# Patient Record
Sex: MALE | Race: BLACK OR AFRICAN AMERICAN | NOT HISPANIC OR LATINO | URBAN - METROPOLITAN AREA
[De-identification: names, ages, dates, MRNs, and addresses within clinical notes are randomized per-mention and may not be internally consistent; named-entity substitution may affect disease eponyms.]

---

## 2022-06-02 ENCOUNTER — EMERGENCY (EMERGENCY)
Facility: HOSPITAL | Age: 57
LOS: 1 days | Discharge: HOME | End: 2022-06-02
Admitting: EMERGENCY MEDICINE
Payer: COMMERCIAL

## 2022-06-02 VITALS
OXYGEN SATURATION: 98 % | SYSTOLIC BLOOD PRESSURE: 155 MMHG | WEIGHT: 279.99 LBS | DIASTOLIC BLOOD PRESSURE: 88 MMHG | RESPIRATION RATE: 18 BRPM | HEART RATE: 84 BPM | TEMPERATURE: 99 F

## 2022-06-02 DIAGNOSIS — M25.461 EFFUSION, RIGHT KNEE: ICD-10-CM

## 2022-06-02 DIAGNOSIS — M25.561 PAIN IN RIGHT KNEE: ICD-10-CM

## 2022-06-02 DIAGNOSIS — M19.90 UNSPECIFIED OSTEOARTHRITIS, UNSPECIFIED SITE: ICD-10-CM

## 2022-06-02 DIAGNOSIS — E11.9 TYPE 2 DIABETES MELLITUS WITHOUT COMPLICATIONS: ICD-10-CM

## 2022-06-02 DIAGNOSIS — I10 ESSENTIAL (PRIMARY) HYPERTENSION: ICD-10-CM

## 2022-06-02 DIAGNOSIS — Z20.822 CONTACT WITH AND (SUSPECTED) EXPOSURE TO COVID-19: ICD-10-CM

## 2022-06-02 DIAGNOSIS — Z96.641 PRESENCE OF RIGHT ARTIFICIAL HIP JOINT: ICD-10-CM

## 2022-06-02 LAB
ANION GAP SERPL CALC-SCNC: 11 MMOL/L — SIGNIFICANT CHANGE UP (ref 5–17)
BASOPHILS # BLD AUTO: 0.04 K/UL — SIGNIFICANT CHANGE UP (ref 0–0.2)
BASOPHILS NFR BLD AUTO: 0.4 % — SIGNIFICANT CHANGE UP (ref 0–2)
BUN SERPL-MCNC: 15 MG/DL — SIGNIFICANT CHANGE UP (ref 7–23)
CALCIUM SERPL-MCNC: 9.6 MG/DL — SIGNIFICANT CHANGE UP (ref 8.4–10.5)
CHLORIDE SERPL-SCNC: 94 MMOL/L — LOW (ref 96–108)
CO2 SERPL-SCNC: 29 MMOL/L — SIGNIFICANT CHANGE UP (ref 22–31)
CREAT SERPL-MCNC: 0.92 MG/DL — SIGNIFICANT CHANGE UP (ref 0.5–1.3)
CRP SERPL-MCNC: 91.3 MG/L — HIGH (ref 0–4)
EGFR: 96 ML/MIN/1.73M2 — SIGNIFICANT CHANGE UP
EOSINOPHIL # BLD AUTO: 0.09 K/UL — SIGNIFICANT CHANGE UP (ref 0–0.5)
EOSINOPHIL NFR BLD AUTO: 1 % — SIGNIFICANT CHANGE UP (ref 0–6)
ERYTHROCYTE [SEDIMENTATION RATE] IN BLOOD: 64 MM/HR — HIGH
GLUCOSE SERPL-MCNC: 123 MG/DL — HIGH (ref 70–99)
HCT VFR BLD CALC: 36.4 % — LOW (ref 39–50)
HGB BLD-MCNC: 12.1 G/DL — LOW (ref 13–17)
IMM GRANULOCYTES NFR BLD AUTO: 1 % — SIGNIFICANT CHANGE UP (ref 0–1.5)
LYMPHOCYTES # BLD AUTO: 1.53 K/UL — SIGNIFICANT CHANGE UP (ref 1–3.3)
LYMPHOCYTES # BLD AUTO: 16.6 % — SIGNIFICANT CHANGE UP (ref 13–44)
MCHC RBC-ENTMCNC: 29.7 PG — SIGNIFICANT CHANGE UP (ref 27–34)
MCHC RBC-ENTMCNC: 33.2 GM/DL — SIGNIFICANT CHANGE UP (ref 32–36)
MCV RBC AUTO: 89.2 FL — SIGNIFICANT CHANGE UP (ref 80–100)
MONOCYTES # BLD AUTO: 1.05 K/UL — HIGH (ref 0–0.9)
MONOCYTES NFR BLD AUTO: 11.4 % — SIGNIFICANT CHANGE UP (ref 2–14)
NEUTROPHILS # BLD AUTO: 6.39 K/UL — SIGNIFICANT CHANGE UP (ref 1.8–7.4)
NEUTROPHILS NFR BLD AUTO: 69.6 % — SIGNIFICANT CHANGE UP (ref 43–77)
NRBC # BLD: 0 /100 WBCS — SIGNIFICANT CHANGE UP (ref 0–0)
PLATELET # BLD AUTO: 257 K/UL — SIGNIFICANT CHANGE UP (ref 150–400)
POTASSIUM SERPL-MCNC: 4.5 MMOL/L — SIGNIFICANT CHANGE UP (ref 3.5–5.3)
POTASSIUM SERPL-SCNC: 4.5 MMOL/L — SIGNIFICANT CHANGE UP (ref 3.5–5.3)
RBC # BLD: 4.08 M/UL — LOW (ref 4.2–5.8)
RBC # FLD: 12.8 % — SIGNIFICANT CHANGE UP (ref 10.3–14.5)
SARS-COV-2 RNA SPEC QL NAA+PROBE: NEGATIVE — SIGNIFICANT CHANGE UP
SODIUM SERPL-SCNC: 134 MMOL/L — LOW (ref 135–145)
URATE SERPL-MCNC: 5.3 MG/DL — SIGNIFICANT CHANGE UP (ref 3.4–8.8)
WBC # BLD: 9.19 K/UL — SIGNIFICANT CHANGE UP (ref 3.8–10.5)
WBC # FLD AUTO: 9.19 K/UL — SIGNIFICANT CHANGE UP (ref 3.8–10.5)

## 2022-06-02 PROCEDURE — 99284 EMERGENCY DEPT VISIT MOD MDM: CPT | Mod: 25

## 2022-06-02 PROCEDURE — 80048 BASIC METABOLIC PNL TOTAL CA: CPT

## 2022-06-02 PROCEDURE — 84550 ASSAY OF BLOOD/URIC ACID: CPT

## 2022-06-02 PROCEDURE — 86140 C-REACTIVE PROTEIN: CPT

## 2022-06-02 PROCEDURE — 73502 X-RAY EXAM HIP UNI 2-3 VIEWS: CPT

## 2022-06-02 PROCEDURE — 73564 X-RAY EXAM KNEE 4 OR MORE: CPT

## 2022-06-02 PROCEDURE — 85652 RBC SED RATE AUTOMATED: CPT

## 2022-06-02 PROCEDURE — 73564 X-RAY EXAM KNEE 4 OR MORE: CPT | Mod: 26

## 2022-06-02 PROCEDURE — 96374 THER/PROPH/DIAG INJ IV PUSH: CPT

## 2022-06-02 PROCEDURE — 85025 COMPLETE CBC W/AUTO DIFF WBC: CPT

## 2022-06-02 PROCEDURE — 73502 X-RAY EXAM HIP UNI 2-3 VIEWS: CPT | Mod: 26,RT

## 2022-06-02 PROCEDURE — 73564 X-RAY EXAM KNEE 4 OR MORE: CPT | Mod: 26,RT

## 2022-06-02 PROCEDURE — 99281 EMR DPT VST MAYX REQ PHY/QHP: CPT

## 2022-06-02 PROCEDURE — 73502 X-RAY EXAM HIP UNI 2-3 VIEWS: CPT | Mod: 26

## 2022-06-02 PROCEDURE — 87635 SARS-COV-2 COVID-19 AMP PRB: CPT

## 2022-06-02 PROCEDURE — 36415 COLL VENOUS BLD VENIPUNCTURE: CPT

## 2022-06-02 RX ORDER — KETOROLAC TROMETHAMINE 30 MG/ML
15 SYRINGE (ML) INJECTION ONCE
Refills: 0 | Status: DISCONTINUED | OUTPATIENT
Start: 2022-06-02 | End: 2022-06-02

## 2022-06-02 RX ADMIN — Medication 15 MILLIGRAM(S): at 14:22

## 2022-06-02 NOTE — ED PROVIDER NOTE - NSFOLLOWUPINSTRUCTIONS_ED_ALL_ED_FT
Take tylenol 650mg or motrin 400-800mg as needed every 4-6 hours for pain.   Continue percocet as prescribed by your surgeon     REST- Rest your hurting/injured joint or extremity to decrease pain and swelling for 24-48 hours    ICE- Apply ice to area of pain to decreased inflammation and pain, put towel/barrier between ice and skin. You can keep ice on for 20 minutes at a time 4-8 times daily   COMPRESSION- Wear ace wrap or brace for support to reduce swelling.  Make sure not to wrap too tight, loosen if skin feeling numb/tingling or skin turns blue   ELEVATION- Elevate hurting/injured area 6 or more inches about level of heart to decrease swelling/inflammation.  Use pillow under joint to elevate area Take tylenol 650mg or motrin 400-800mg as needed every 4-6 hours for pain.   Continue percocet as prescribed by your surgeon     REST- Rest your hurting/injured joint or extremity to decrease pain and swelling for 24-48 hours    ICE- Apply ice to area of pain to decreased inflammation and pain, put towel/barrier between ice and skin. You can keep ice on for 20 minutes at a time 4-8 times daily   COMPRESSION- Wear ace wrap or brace for support to reduce swelling.  Make sure not to wrap too tight, loosen if skin feeling numb/tingling or skin turns blue   ELEVATION- Elevate hurting/injured area 6 or more inches about level of heart to decrease swelling/inflammation.  Use pillow under joint to elevate area    Call to arrange follow up with your surgeon at [place] within one week

## 2022-06-02 NOTE — ED PROVIDER NOTE - PROGRESS NOTE DETAILS
d/w ortho, suspect post surgical changes.  recommend adding NSAIDs, continue ambulation w/ cane and f/u with primary surgeon at [facility].  educated on RICE and discussed strict return parameters

## 2022-06-02 NOTE — CONSULT NOTE ADULT - SUBJECTIVE AND OBJECTIVE BOX
Orthopaedic Surgery Consult Note    For Surgeon:    HPI:  Patient is a 57-year-old Male who presents with a chief complaint of right knee pain. Patient underwent right hip replacement surgery on 5/27/22 at [facility]. On physical exam right knee appears swollen and tender to palpation in the suprapatellar area. Right knee is edematous in the suprapatellar region. No erythema, warmth, or lacerations are present on the knees bilaterally. No calf pain on examination of the right knee. Patient was sitting in an upright position with the legs in extension bilaterally. Patient unable to flex the right knee past 60 degrees secondary to pain.     Allergies    No Known Allergies    Intolerances      PAST MEDICAL & SURGICAL HISTORY:  HTN (hypertension)      DM (diabetes mellitus)      No significant past surgical history        MEDICATIONS  (STANDING):    MEDICATIONS  (PRN):      Vital Signs Last 24 Hrs  T(C): 37.3 (02 Jun 2022 12:35), Max: 37.3 (02 Jun 2022 12:35)  T(F): 99.1 (02 Jun 2022 12:35), Max: 99.1 (02 Jun 2022 12:35)  HR: 84 (02 Jun 2022 12:35) (84 - 84)  BP: 155/88 (02 Jun 2022 12:35) (155/88 - 155/88)  BP(mean): --  RR: 18 (02 Jun 2022 12:35) (18 - 18)  SpO2: 98% (02 Jun 2022 12:35) (98% - 98%)    Physical Exam:                          12.1   9.19  )-----------( 257      ( 02 Jun 2022 13:31 )             36.4     06-02    134<L>  |  94<L>  |  15  ----------------------------<  123<H>  4.5   |  29  |  0.92    Ca    9.6      02 Jun 2022 13:31        Imaging:     A/P: 57yMale    -Discussed with Dr. Vaca Pager [phone]   Orthopaedic Surgery Consult Note    For Surgeon: Rylie (Sal Primary Ortho surgeon at [facility])    HPI:  Patient is a 57-year-old Male who presents with a chief complaint of right knee pain x "few days". Patient underwent right hip replacement surgery on 5/27/22 at [facility] with Orthopedic Surgeon Sal. Patient was discharged from the hospital on the 28th. Patient began having knee pain the following day on the 29th and called Dr. Huang's office. A doppler ultrasound of the right knee was performed at [facility] and was negative for any DVTs. Patient complained of continued right knee pain and was seen at an urgent care on 6/2/22 where he was sent to the ED at [facility] for further workup. Patient denies any fevers, chiills, Hx gout, numbness, tingling.     Allergies: No Known Allergies    Intolerances      PAST MEDICAL & SURGICAL HISTORY:  Right hip replacement surgery 5/27/22 at [place] with Dr. Huang    Denies history of HTN, DM, HLD, or gout      MEDICATIONS  (STANDING):    MEDICATIONS  (PRN):      Vital Signs Last 24 Hrs  T(C): 37.3 (02 Jun 2022 12:35), Max: 37.3 (02 Jun 2022 12:35)  T(F): 99.1 (02 Jun 2022 12:35), Max: 99.1 (02 Jun 2022 12:35)  HR: 84 (02 Jun 2022 12:35) (84 - 84)  BP: 155/88 (02 Jun 2022 12:35) (155/88 - 155/88)  BP(mean): --  RR: 18 (02 Jun 2022 12:35) (18 - 18)  SpO2: 98% (02 Jun 2022 12:35) (98% - 98%)    Physical Exam:  Patient observed sitting upright in a chair with his legs in extension bilaterally.   RLE:  Knee swollen, no erythema, no wamth. Tender to palpation most in the suprapatellar area. No calf pain. Flexion 0-60 degrees with pain past 60 degrees.   Sensation intact.   Pulses intact  Strength EHL/FHL/TA/GS 5/5                            12.1   9.19  )-----------( 257      ( 02 Jun 2022 13:31 )             36.4     06-02    134<L>  |  94<L>  |  15  ----------------------------<  123<H>  4.5   |  29  |  0.92    Ca    9.6      02 Jun 2022 13:31        Imaging: xray right knee shows no acute changes. xray pelvis shows Right hip prosthesis in place.    A/P: 57yMale POD#6 s/p Right THR with Right knee pain/swelling, likely 2/2 recent THR.  -patient knee pain improved after medicated in ED, ambulating with cane in ED.  -ice, NSAIDs, pain control  -WBAT w/ assistive device  -Discussed with Dr. Youngblood, follow up with [facility] Ortho Surgeon early next week.    Ortho Pager [phone]

## 2022-06-02 NOTE — ED PROVIDER NOTE - PHYSICAL EXAMINATION
Vitals reviewed  Gen: well appearing, nad, speaking in full sentences  Skin: wwp, no rash/lesions  HEENT: ncat, eomi, mmm  CV: rrr, no audible m/r/g  Resp: symmetrical expansion, ctab, no w/r/r  Abd: nondistended, soft/nt  Ext: RLE: swelling with mild erythema and warmth, TTP diffusely over lateral knee. Able to fully extend, can only flex to 160 degrees. FROM Hip and ankle. + Edema of lower leg, no calf tenderness. 2+ distal pulses. FROM all over extremities, NVI.  Neuro: alert/oriented, no focal deficits, steady gait Vitals reviewed  Gen: well appearing at rest, nad, speaking in full sentences  Skin: wwp, no rash/lesions  HEENT: ncat, eomi, mmm  CV: rrr, no audible m/r/g  Resp: symmetrical expansion, ctab, no w/r/r  Abd: nondistended, soft/nt  RLE: swelling with mild erythema and warmth w/ TTP diffusely over lateral knee. Able to fully extend, can only flex knee to 160 degrees. FROM ankle/toes. + Edema of lower leg extending from hip, no calf tenderness. 2+ distal pulses. SILT, compartments soft   FROM all over extremities, NVI  Neuro: alert/oriented, no focal deficits, gait not assessed due to pain Vitals reviewed  Gen: well appearing at rest, nad, speaking in full sentences  Skin: wwp, no rash/lesions  HEENT: ncat, eomi, mmm  CV: rrr, no audible m/r/g  Resp: symmetrical expansion, ctab, no w/r/r  Abd: nondistended, soft/nt  RLE: swelling with mild erythema and warmth w/ TTP localized over lateral knee. Able to fully extend, can only flex knee to 160 degrees. FROM ankle/toes. + Edema of lower leg extending from hip, no calf tenderness. 2+ distal pulses. SILT, compartments soft   FROM all over extremities, NVI  Neuro: alert/oriented, no focal deficits, gait not assessed due to pain

## 2022-06-02 NOTE — ED PROVIDER NOTE - CLINICAL SUMMARY MEDICAL DECISION MAKING FREE TEXT BOX
56 y/o M with PMHx of HTN DM, arthritis, R hip replacement on 5/27 at [facility], referred from  for R knee pain, swelling for the past month and inability to bear weight for the past 24h, concerned for septic joint.  on exam RLE: swelling with mild erythema and warmth w/ TTP diffusely over lateral knee. Able to fully extend, can only flex knee to 160 degrees. FROM ankle/toes. + Edema of lower leg extending from hip, no calf tenderness. 2+ distal pulses. SILT, compartments soft.  had neg dvt study yesterday.   likely sxs 2/2 recent hip surgery but will obtain labs/xrays and c/s ortho for eval 56 y/o M with PMHx of HTN DM, arthritis, R hip replacement on 5/27 at [facility], referred from  for R knee pain, swelling for the past month and inability to bear weight for the past 24h, concerned for septic joint.  on exam RLE: swelling with mild erythema and warmth w/ TTP localized over lateral knee. Able to fully extend, can only flex knee to 160 degrees. FROM ankle/toes. + Edema of lower leg extending from hip, no calf tenderness. 2+ distal pulses. SILT, compartments soft.  had neg dvt study yesterday.   likely sxs 2/2 recent hip surgery but will obtain labs/xrays and c/s ortho for eval

## 2022-06-02 NOTE — ED PROVIDER NOTE - PATIENT PORTAL LINK FT
You can access the FollowMyHealth Patient Portal offered by [facility] by registering at the following website: [email or web address]. By joining [employer]’s FollowMyHealth portal, you will also be able to view your health information using other applications (apps) compatible with our system.

## 2022-06-02 NOTE — ED ADULT TRIAGE NOTE - CHIEF COMPLAINT QUOTE
Patient w/ acute right knee pain X 1 month, sent from  today, r/o bacterial infection VS gout, possible joint space aspiration.  Denies recent injury/falls, no fever or chills, able to bear weight and ambulate on own.  DVT ruled out in ED yesterday.  Patient states had MRI 1 month ago, unsure of results.

## 2022-06-02 NOTE — ED PROVIDER NOTE - OBJECTIVE STATEMENT
56 y/o M with PMHx of HTN DM, arthritis, R hip replacement on 5/27 at [facility], referred from  for R knee pain, swelling for the past month and inability to bear weight for the past 24h, concerned for septic joint. Over the past month patient with worsening pain over R knee, dull, throbbing, over past 24h now swollen. Pain on lateral aspect and difficulty moving knee or bearing weight. Had US of RLE yesterday at [facility] showing no evidence of DVT, told that his R hip wound is healing well. Patient went to  today and sent to the ED. No fever, chills, numbness, weakness, chest pain, SOB, fall, trauma. 58 y/o M with PMHx of HTN DM, arthritis, R hip replacement on 5/27 at [facility], referred from  for R knee pain, swelling for the past month and inability to bear weight for the past 24h, concerned for septic joint. Over the past month patient with worsening pain over R knee, dull, throbbing, over past 24h now swollen. Pain on lateral aspect and difficulty moving knee or bearing weight. Had US of RLE yesterday at [facility] showing no evidence of DVT, told that his R hip wound is healing well. Patient went to  today and sent to the ED. No fever, chills, numbness, weakness, chest pain, SOB, fall, trauma.  reports taking percocet at home s/p hip replacement, no NSAIDs.  ambulates w/ cane

## 2022-06-02 NOTE — ED ADULT NURSE NOTE - OBJECTIVE STATEMENT
Pt reports right knee pain since last night, denies chills, fever. No injuries/falls. reports pain with ambulation.

## 2023-05-08 NOTE — ED ADULT NURSE NOTE - NSFALLRSKASSESSTYPE_ED_ALL_ED
Initial (On Arrival) Protopic Counseling: Patient may experience a mild burning sensation during topical application. Protopic is not approved in children less than 2 years of age. There have been case reports of hematologic and skin malignancies in patients using topical calcineurin inhibitors although causality is questionable.